# Patient Record
Sex: MALE | Race: OTHER
[De-identification: names, ages, dates, MRNs, and addresses within clinical notes are randomized per-mention and may not be internally consistent; named-entity substitution may affect disease eponyms.]

---

## 2019-09-26 ENCOUNTER — HOSPITAL ENCOUNTER (EMERGENCY)
Dept: HOSPITAL 74 - JERFT | Age: 39
Discharge: HOME | End: 2019-09-26
Payer: COMMERCIAL

## 2019-09-26 VITALS — DIASTOLIC BLOOD PRESSURE: 86 MMHG | HEART RATE: 82 BPM | TEMPERATURE: 98.2 F | SYSTOLIC BLOOD PRESSURE: 137 MMHG

## 2019-09-26 VITALS — BODY MASS INDEX: 27.4 KG/M2

## 2019-09-26 DIAGNOSIS — M62.830: Primary | ICD-10-CM

## 2019-09-26 PROCEDURE — 3E0233Z INTRODUCTION OF ANTI-INFLAMMATORY INTO MUSCLE, PERCUTANEOUS APPROACH: ICD-10-PCS | Performed by: STUDENT IN AN ORGANIZED HEALTH CARE EDUCATION/TRAINING PROGRAM

## 2019-09-26 NOTE — PDOC
History of Present Illness





- General


Chief Complaint: Back Pain


Stated Complaint: BACK PAIN


Time Seen by Provider: 09/26/19 09:44


History Source: Patient


Exam Limitations: Language Barrier (Nigerian ID#[id number])





Past History





- Past Medical History


Allergies/Adverse Reactions: 


 Allergies











Allergy/AdvReac Type Severity Reaction Status Date / Time


 


No Known Allergies Allergy   Verified 09/26/19 09:20











Home Medications: 


Ambulatory Orders





Methocarbamol [Robaxin-750] 1,500 mg PO Q6H PRN #24 tablet 09/26/19 








COPD: No


Other medical history: DENIES





- Immunization History


Immunization Up to Date: Yes





- Psycho Social/Smoking Cessation Hx


Smoking History: Never smoked


Have you smoked in the past 12 months: No


Information on smoking cessation initiated: No


Hx Alcohol Use: No


Drug/Substance Use Hx: No





*Physical Exam





- Vital Signs


 Last Vital Signs











Temp Pulse Resp BP Pulse Ox


 


 98.2 F   82   18   137/86   97 


 


 09/26/19 09:18  09/26/19 09:18  09/26/19 09:18  09/26/19 09:18  09/26/19 09:18














- Physical Exam


General Appearance: No: Apparent Distress


Respiratory/Chest: positive: Lungs Clear, Normal Breath Sounds.  negative: 

Respiratory Distress


Cardiovascular: positive: Regular Rhythm, Regular Rate, S1, S2.  negative: 

Murmur


Gastrointestinal/Abdominal: positive: Soft.  negative: Tender


Musculoskeletal: positive: Muscle Spasm (along R thoracic paraspinal muscles).  

negative: CVA Tenderness, Vertebral Tenderness


Neurologic: positive: Fully Oriented, Alert, Normal Mood/Affect, Motor Strength 

5/5, Other (normal gait)





ED Treatment Course





- Medications


Given in the ED: 


ED Medications














Discontinued Medications














Generic Name Dose Route Start Last Admin





  Trade Name Freq  PRN Reason Stop Dose Admin


 


Ketorolac Tromethamine  60 mg  09/26/19 10:19  09/26/19 10:33





  Toradol Injection -  IM  09/26/19 10:20  60 mg





  ONCE ONE   Administration





     





     





     





     


 


Methocarbamol  1,500 mg  09/26/19 10:30  09/26/19 10:33





  Robaxin -  PO  09/26/19 10:31  1,500 mg





  ONCE ONE   Administration





     





     





     





     














Medical Decision Making





- Medical Decision Making








40 y/o M with no sig pmh presents with middle back pain, constant in nature, 

worse with movement of spine and deep breaths. Has not tried taking anything 

for pain at home. Denies fever, sob, cp, abd pain, n/v, urinary complaints, 

numbness/tingling/weakness of extremities. Works in [employer]. Denies recent 

trauma/heavy lifting.





+muscle spasms/tightness on exam


Likely MSK pain


Given Toradol and Robaxin


stable for dc





09/26/19 11:01








Discharge





- Discharge Information


Problems reviewed: Yes


Clinical Impression/Diagnosis: 


 Back muscle spasm





Condition: Stable


Disposition: HOME





- Admission


No





- Additional Discharge Information


Prescriptions: 


Methocarbamol [Robaxin-750] 1,500 mg PO Q6H PRN #24 tablet


 PRN Reason: Muscle Spasms


Prescription Drug Monitoring Program (I-STOP) results: I-STOP not reviewed





- Follow up/Referral





- Patient Discharge Instructions


Patient Printed Discharge Instructions:  DI for Thoracic Back Pain


Additional Instructions: 





Thank you for choosing [facility].  It was a pleasure taking 

care of you.  





You may take Motrin 600 mg every 6 hours by mouth as needed for mild to 

moderate pain.  Take Motrin with food.  


Take Robaxin as needed for muscle spasms.  This medication can also make you 

drowsy so please be cautious with driving or performing heavy physical work. 


Also recommend warm compresses and epsom salt baths 


Follow-up with your doctor in 2 days





Return to the Emergency Department if your symptoms worsen or persist, you have 

fever, chest pain, severe abdominal pain, vomiting, weakness of extremities (

arms and/or legs), changes in vision or walking or other concerning symptoms. 





Andria por elegir el [facility]. Fue un placer cuidar de ti.





Puede maria e Motrin 600 mg cada 6 horas por va oral segn sea necesario para el 

dolor leve a moderado. Tayler Motrin con comida.


[place] Robaxin segn sea necesario para los espasmos musculares. Anh medicamento 

tambin puede causar somnolencia, as que tenga cuidado al conducir o realizar 

trabajos fsicos pesados.


Babin recomendamos compresas tibias y baos de leia epsom


Seguimiento con cantu mdico en 2 vargas.





Regrese al departamento de emergencias si ortiz sntomas empeoran o persisten, 

tiene fiebre, dolor en el pecho, dolor abdominal intenso, vmitos, debilidad de 

las extremidades (brazos y / o piernas), cambios en la visin o al caminar u 

otros sntomas relacionados.


Print Language: Bhutanese





- Post Discharge Activity